# Patient Record
Sex: FEMALE | Race: WHITE | Employment: OTHER | URBAN - METROPOLITAN AREA
[De-identification: names, ages, dates, MRNs, and addresses within clinical notes are randomized per-mention and may not be internally consistent; named-entity substitution may affect disease eponyms.]

---

## 2019-10-28 ENCOUNTER — ANESTHESIA EVENT (OUTPATIENT)
Dept: SURGERY | Facility: CLINIC | Age: 54
End: 2019-10-28
Payer: COMMERCIAL

## 2019-10-28 ENCOUNTER — ANESTHESIA (OUTPATIENT)
Dept: SURGERY | Facility: CLINIC | Age: 54
End: 2019-10-28
Payer: COMMERCIAL

## 2019-10-28 ENCOUNTER — APPOINTMENT (OUTPATIENT)
Dept: GENERAL RADIOLOGY | Facility: CLINIC | Age: 54
End: 2019-10-28
Attending: ORTHOPAEDIC SURGERY
Payer: COMMERCIAL

## 2019-10-28 ENCOUNTER — HOSPITAL ENCOUNTER (OUTPATIENT)
Facility: CLINIC | Age: 54
Discharge: HOME | End: 2019-10-29
Attending: ORTHOPAEDIC SURGERY | Admitting: ORTHOPAEDIC SURGERY
Payer: COMMERCIAL

## 2019-10-28 PROBLEM — M51.26 LUMBAR DISC HERNIATION: Status: ACTIVE | Noted: 2019-10-28

## 2019-10-28 LAB — HCG UR QL: NEGATIVE

## 2019-10-28 PROCEDURE — 25000125 ZZHC RX 250: Performed by: ANESTHESIOLOGY

## 2019-10-28 PROCEDURE — 25000128 H RX IP 250 OP 636: Performed by: ANESTHESIOLOGY

## 2019-10-28 PROCEDURE — 25000125 ZZHC RX 250: Performed by: NURSE ANESTHETIST, CERTIFIED REGISTERED

## 2019-10-28 PROCEDURE — 25800030 ZZH RX IP 258 OP 636: Performed by: ANESTHESIOLOGY

## 2019-10-28 PROCEDURE — 37000009 ZZH ANESTHESIA TECHNICAL FEE, EACH ADDTL 15 MIN: Performed by: ORTHOPAEDIC SURGERY

## 2019-10-28 PROCEDURE — 25800030 ZZH RX IP 258 OP 636: Performed by: NURSE ANESTHETIST, CERTIFIED REGISTERED

## 2019-10-28 PROCEDURE — 25000128 H RX IP 250 OP 636: Performed by: PHYSICIAN ASSISTANT

## 2019-10-28 PROCEDURE — 36000063 ZZH SURGERY LEVEL 4 EA 15 ADDTL MIN: Performed by: ORTHOPAEDIC SURGERY

## 2019-10-28 PROCEDURE — 81025 URINE PREGNANCY TEST: CPT | Performed by: ANESTHESIOLOGY

## 2019-10-28 PROCEDURE — 25000132 ZZH RX MED GY IP 250 OP 250 PS 637: Performed by: PHYSICIAN ASSISTANT

## 2019-10-28 PROCEDURE — 40000170 ZZH STATISTIC PRE-PROCEDURE ASSESSMENT II: Performed by: ORTHOPAEDIC SURGERY

## 2019-10-28 PROCEDURE — 25000128 H RX IP 250 OP 636: Performed by: ORTHOPAEDIC SURGERY

## 2019-10-28 PROCEDURE — 25000128 H RX IP 250 OP 636: Performed by: NURSE ANESTHETIST, CERTIFIED REGISTERED

## 2019-10-28 PROCEDURE — 25000566 ZZH SEVOFLURANE, EA 15 MIN: Performed by: ORTHOPAEDIC SURGERY

## 2019-10-28 PROCEDURE — 37000008 ZZH ANESTHESIA TECHNICAL FEE, 1ST 30 MIN: Performed by: ORTHOPAEDIC SURGERY

## 2019-10-28 PROCEDURE — 27210794 ZZH OR GENERAL SUPPLY STERILE: Performed by: ORTHOPAEDIC SURGERY

## 2019-10-28 PROCEDURE — 25000125 ZZHC RX 250: Performed by: ORTHOPAEDIC SURGERY

## 2019-10-28 PROCEDURE — 36000065 ZZH SURGERY LEVEL 4 W FLUORO 1ST 30 MIN: Performed by: ORTHOPAEDIC SURGERY

## 2019-10-28 PROCEDURE — 71000012 ZZH RECOVERY PHASE 1 LEVEL 1 FIRST HR: Performed by: ORTHOPAEDIC SURGERY

## 2019-10-28 PROCEDURE — 40000277 XR SURGERY CARM FLUORO LESS THAN 5 MIN W STILLS

## 2019-10-28 RX ORDER — HYDROMORPHONE HYDROCHLORIDE 1 MG/ML
.3-.5 INJECTION, SOLUTION INTRAMUSCULAR; INTRAVENOUS; SUBCUTANEOUS EVERY 5 MIN PRN
Status: DISCONTINUED | OUTPATIENT
Start: 2019-10-28 | End: 2019-10-28 | Stop reason: HOSPADM

## 2019-10-28 RX ORDER — CEFAZOLIN SODIUM 2 G/100ML
2 INJECTION, SOLUTION INTRAVENOUS
Status: COMPLETED | OUTPATIENT
Start: 2019-10-28 | End: 2019-10-28

## 2019-10-28 RX ORDER — HYDROCODONE BITARTRATE AND ACETAMINOPHEN 5; 325 MG/1; MG/1
1-2 TABLET ORAL EVERY 6 HOURS PRN
COMMUNITY

## 2019-10-28 RX ORDER — ONDANSETRON 4 MG/1
4 TABLET, ORALLY DISINTEGRATING ORAL EVERY 30 MIN PRN
Status: DISCONTINUED | OUTPATIENT
Start: 2019-10-28 | End: 2019-10-28 | Stop reason: HOSPADM

## 2019-10-28 RX ORDER — NALOXONE HYDROCHLORIDE 0.4 MG/ML
.1-.4 INJECTION, SOLUTION INTRAMUSCULAR; INTRAVENOUS; SUBCUTANEOUS
Status: CANCELLED | OUTPATIENT
Start: 2019-10-28 | End: 2019-10-29

## 2019-10-28 RX ORDER — DEXAMETHASONE SODIUM PHOSPHATE 4 MG/ML
INJECTION, SOLUTION INTRA-ARTICULAR; INTRALESIONAL; INTRAMUSCULAR; INTRAVENOUS; SOFT TISSUE PRN
Status: DISCONTINUED | OUTPATIENT
Start: 2019-10-28 | End: 2019-10-28

## 2019-10-28 RX ORDER — CEFAZOLIN SODIUM 1 G/3ML
1 INJECTION, POWDER, FOR SOLUTION INTRAMUSCULAR; INTRAVENOUS SEE ADMIN INSTRUCTIONS
Status: DISCONTINUED | OUTPATIENT
Start: 2019-10-28 | End: 2019-10-28 | Stop reason: HOSPADM

## 2019-10-28 RX ORDER — DIPHENHYDRAMINE HYDROCHLORIDE 50 MG/ML
INJECTION INTRAMUSCULAR; INTRAVENOUS PRN
Status: DISCONTINUED | OUTPATIENT
Start: 2019-10-28 | End: 2019-10-28

## 2019-10-28 RX ORDER — CHOLECALCIFEROL (VITAMIN D3) 50 MCG
1 TABLET ORAL DAILY
COMMUNITY

## 2019-10-28 RX ORDER — NALOXONE HYDROCHLORIDE 0.4 MG/ML
.1-.4 INJECTION, SOLUTION INTRAMUSCULAR; INTRAVENOUS; SUBCUTANEOUS
Status: DISCONTINUED | OUTPATIENT
Start: 2019-10-28 | End: 2019-10-29 | Stop reason: HOSPADM

## 2019-10-28 RX ORDER — ACETAMINOPHEN 500 MG
1000 TABLET ORAL EVERY 6 HOURS PRN
COMMUNITY

## 2019-10-28 RX ORDER — FENTANYL CITRATE 50 UG/ML
25-50 INJECTION, SOLUTION INTRAMUSCULAR; INTRAVENOUS
Status: DISCONTINUED | OUTPATIENT
Start: 2019-10-28 | End: 2019-10-28 | Stop reason: HOSPADM

## 2019-10-28 RX ORDER — SODIUM CHLORIDE 9 MG/ML
INJECTION, SOLUTION INTRAVENOUS CONTINUOUS
Status: DISCONTINUED | OUTPATIENT
Start: 2019-10-28 | End: 2019-10-28 | Stop reason: CLARIF

## 2019-10-28 RX ORDER — PROPOFOL 10 MG/ML
INJECTION, EMULSION INTRAVENOUS PRN
Status: DISCONTINUED | OUTPATIENT
Start: 2019-10-28 | End: 2019-10-28

## 2019-10-28 RX ORDER — LEVOTHYROXINE SODIUM 100 UG/1
100 TABLET ORAL DAILY
Status: DISCONTINUED | OUTPATIENT
Start: 2019-10-29 | End: 2019-10-29 | Stop reason: HOSPADM

## 2019-10-28 RX ORDER — PROCHLORPERAZINE MALEATE 5 MG
10 TABLET ORAL EVERY 6 HOURS PRN
Status: DISCONTINUED | OUTPATIENT
Start: 2019-10-28 | End: 2019-10-29 | Stop reason: HOSPADM

## 2019-10-28 RX ORDER — OXYCODONE HYDROCHLORIDE 5 MG/1
5-10 TABLET ORAL
Status: DISCONTINUED | OUTPATIENT
Start: 2019-10-28 | End: 2019-10-29 | Stop reason: HOSPADM

## 2019-10-28 RX ORDER — HYDROCORTISONE 25 MG/G
OINTMENT TOPICAL 2 TIMES DAILY PRN
COMMUNITY

## 2019-10-28 RX ORDER — ONDANSETRON 2 MG/ML
4 INJECTION INTRAMUSCULAR; INTRAVENOUS EVERY 6 HOURS PRN
Status: DISCONTINUED | OUTPATIENT
Start: 2019-10-28 | End: 2019-10-29 | Stop reason: HOSPADM

## 2019-10-28 RX ORDER — ACETAMINOPHEN 325 MG/1
975 TABLET ORAL ONCE
Status: DISCONTINUED | OUTPATIENT
Start: 2019-10-28 | End: 2019-10-28 | Stop reason: HOSPADM

## 2019-10-28 RX ORDER — PREDNISONE 10 MG/1
TABLET ORAL DAILY
Status: ON HOLD | COMMUNITY
End: 2019-10-29

## 2019-10-28 RX ORDER — BETAMETHASONE SODIUM PHOSPHATE AND BETAMETHASONE ACETATE 3; 3 MG/ML; MG/ML
INJECTION, SUSPENSION INTRA-ARTICULAR; INTRALESIONAL; INTRAMUSCULAR; SOFT TISSUE PRN
Status: DISCONTINUED | OUTPATIENT
Start: 2019-10-28 | End: 2019-10-28 | Stop reason: HOSPADM

## 2019-10-28 RX ORDER — CYCLOBENZAPRINE HCL 10 MG
10 TABLET ORAL
COMMUNITY

## 2019-10-28 RX ORDER — SCOLOPAMINE TRANSDERMAL SYSTEM 1 MG/1
1 PATCH, EXTENDED RELEASE TRANSDERMAL ONCE
Status: COMPLETED | OUTPATIENT
Start: 2019-10-28 | End: 2019-10-28

## 2019-10-28 RX ORDER — SODIUM CHLORIDE, SODIUM LACTATE, POTASSIUM CHLORIDE, CALCIUM CHLORIDE 600; 310; 30; 20 MG/100ML; MG/100ML; MG/100ML; MG/100ML
INJECTION, SOLUTION INTRAVENOUS CONTINUOUS
Status: DISCONTINUED | OUTPATIENT
Start: 2019-10-28 | End: 2019-10-28 | Stop reason: HOSPADM

## 2019-10-28 RX ORDER — ACETAMINOPHEN 325 MG/1
650 TABLET ORAL EVERY 6 HOURS PRN
Status: DISCONTINUED | OUTPATIENT
Start: 2019-10-28 | End: 2019-10-29 | Stop reason: HOSPADM

## 2019-10-28 RX ORDER — FENTANYL CITRATE 50 UG/ML
INJECTION, SOLUTION INTRAMUSCULAR; INTRAVENOUS PRN
Status: DISCONTINUED | OUTPATIENT
Start: 2019-10-28 | End: 2019-10-28

## 2019-10-28 RX ORDER — ONDANSETRON 2 MG/ML
4 INJECTION INTRAMUSCULAR; INTRAVENOUS EVERY 30 MIN PRN
Status: DISCONTINUED | OUTPATIENT
Start: 2019-10-28 | End: 2019-10-28 | Stop reason: HOSPADM

## 2019-10-28 RX ORDER — LANOLIN ALCOHOL/MO/W.PET/CERES
1000 CREAM (GRAM) TOPICAL DAILY
COMMUNITY

## 2019-10-28 RX ORDER — FENTANYL CITRATE 50 UG/ML
50 INJECTION, SOLUTION INTRAMUSCULAR; INTRAVENOUS EVERY 5 MIN PRN
Status: DISCONTINUED | OUTPATIENT
Start: 2019-10-28 | End: 2019-10-28 | Stop reason: HOSPADM

## 2019-10-28 RX ORDER — ONDANSETRON 2 MG/ML
INJECTION INTRAMUSCULAR; INTRAVENOUS PRN
Status: DISCONTINUED | OUTPATIENT
Start: 2019-10-28 | End: 2019-10-28

## 2019-10-28 RX ORDER — NEOSTIGMINE METHYLSULFATE 1 MG/ML
VIAL (ML) INJECTION PRN
Status: DISCONTINUED | OUTPATIENT
Start: 2019-10-28 | End: 2019-10-28

## 2019-10-28 RX ORDER — LEVOTHYROXINE SODIUM 100 UG/1
100 TABLET ORAL DAILY
COMMUNITY

## 2019-10-28 RX ORDER — TACROLIMUS 1 MG/G
OINTMENT TOPICAL 2 TIMES DAILY PRN
COMMUNITY

## 2019-10-28 RX ORDER — HYDROMORPHONE HYDROCHLORIDE 1 MG/ML
.3-.5 INJECTION, SOLUTION INTRAMUSCULAR; INTRAVENOUS; SUBCUTANEOUS EVERY 30 MIN PRN
Status: DISCONTINUED | OUTPATIENT
Start: 2019-10-28 | End: 2019-10-29 | Stop reason: HOSPADM

## 2019-10-28 RX ORDER — LIDOCAINE HYDROCHLORIDE 10 MG/ML
INJECTION, SOLUTION EPIDURAL; INFILTRATION; INTRACAUDAL; PERINEURAL PRN
Status: DISCONTINUED | OUTPATIENT
Start: 2019-10-28 | End: 2019-10-28 | Stop reason: HOSPADM

## 2019-10-28 RX ORDER — ONDANSETRON 4 MG/1
4 TABLET, ORALLY DISINTEGRATING ORAL EVERY 6 HOURS PRN
Status: DISCONTINUED | OUTPATIENT
Start: 2019-10-28 | End: 2019-10-29 | Stop reason: HOSPADM

## 2019-10-28 RX ORDER — LIDOCAINE 40 MG/G
CREAM TOPICAL
Status: DISCONTINUED | OUTPATIENT
Start: 2019-10-28 | End: 2019-10-29 | Stop reason: HOSPADM

## 2019-10-28 RX ORDER — LIDOCAINE HYDROCHLORIDE 20 MG/ML
INJECTION, SOLUTION INFILTRATION; PERINEURAL PRN
Status: DISCONTINUED | OUTPATIENT
Start: 2019-10-28 | End: 2019-10-28

## 2019-10-28 RX ORDER — GLYCOPYRROLATE 0.2 MG/ML
INJECTION, SOLUTION INTRAMUSCULAR; INTRAVENOUS PRN
Status: DISCONTINUED | OUTPATIENT
Start: 2019-10-28 | End: 2019-10-28

## 2019-10-28 RX ORDER — PROPOFOL 10 MG/ML
INJECTION, EMULSION INTRAVENOUS CONTINUOUS PRN
Status: DISCONTINUED | OUTPATIENT
Start: 2019-10-28 | End: 2019-10-28

## 2019-10-28 RX ORDER — CEFAZOLIN SODIUM 2 G/100ML
2 INJECTION, SOLUTION INTRAVENOUS EVERY 8 HOURS
Status: DISCONTINUED | OUTPATIENT
Start: 2019-10-28 | End: 2019-10-28 | Stop reason: CLARIF

## 2019-10-28 RX ORDER — KETOROLAC TROMETHAMINE 30 MG/ML
30 INJECTION, SOLUTION INTRAMUSCULAR; INTRAVENOUS ONCE
Status: COMPLETED | OUTPATIENT
Start: 2019-10-28 | End: 2019-10-28

## 2019-10-28 RX ORDER — TIZANIDINE HYDROCHLORIDE 4 MG/1
4 CAPSULE, GELATIN COATED ORAL 3 TIMES DAILY
COMMUNITY

## 2019-10-28 RX ADMIN — ROCURONIUM BROMIDE 40 MG: 10 INJECTION INTRAVENOUS at 15:16

## 2019-10-28 RX ADMIN — SODIUM CHLORIDE, POTASSIUM CHLORIDE, SODIUM LACTATE AND CALCIUM CHLORIDE: 600; 310; 30; 20 INJECTION, SOLUTION INTRAVENOUS at 17:08

## 2019-10-28 RX ADMIN — PHENYLEPHRINE HYDROCHLORIDE 100 MCG: 10 INJECTION INTRAVENOUS at 15:25

## 2019-10-28 RX ADMIN — FENTANYL CITRATE 50 MCG: 0.05 INJECTION, SOLUTION INTRAMUSCULAR; INTRAVENOUS at 17:18

## 2019-10-28 RX ADMIN — PROPOFOL 200 MG: 10 INJECTION, EMULSION INTRAVENOUS at 15:16

## 2019-10-28 RX ADMIN — NEOSTIGMINE METHYLSULFATE 5 MG: 1 INJECTION, SOLUTION INTRAVENOUS at 16:30

## 2019-10-28 RX ADMIN — ROCURONIUM BROMIDE 10 MG: 10 INJECTION INTRAVENOUS at 15:37

## 2019-10-28 RX ADMIN — SCOPALAMINE 1 PATCH: 1 PATCH, EXTENDED RELEASE TRANSDERMAL at 15:04

## 2019-10-28 RX ADMIN — PHENYLEPHRINE HYDROCHLORIDE 50 MCG: 10 INJECTION INTRAVENOUS at 16:21

## 2019-10-28 RX ADMIN — GLYCOPYRROLATE 0.8 MG: 0.2 INJECTION, SOLUTION INTRAMUSCULAR; INTRAVENOUS at 16:30

## 2019-10-28 RX ADMIN — OXYCODONE HYDROCHLORIDE 5 MG: 5 TABLET ORAL at 20:56

## 2019-10-28 RX ADMIN — KETOROLAC TROMETHAMINE 30 MG: 30 INJECTION, SOLUTION INTRAMUSCULAR at 17:13

## 2019-10-28 RX ADMIN — DEXAMETHASONE SODIUM PHOSPHATE 4 MG: 4 INJECTION, SOLUTION INTRA-ARTICULAR; INTRALESIONAL; INTRAMUSCULAR; INTRAVENOUS; SOFT TISSUE at 15:37

## 2019-10-28 RX ADMIN — ONDANSETRON 4 MG: 2 INJECTION INTRAMUSCULAR; INTRAVENOUS at 16:30

## 2019-10-28 RX ADMIN — LIDOCAINE HYDROCHLORIDE 90 MG: 20 INJECTION, SOLUTION INFILTRATION; PERINEURAL at 15:16

## 2019-10-28 RX ADMIN — FENTANYL CITRATE 100 MCG: 50 INJECTION, SOLUTION INTRAMUSCULAR; INTRAVENOUS at 15:16

## 2019-10-28 RX ADMIN — HYDROMORPHONE HYDROCHLORIDE 0.5 MG: 10 INJECTION, SOLUTION INTRAMUSCULAR; INTRAVENOUS; SUBCUTANEOUS at 16:11

## 2019-10-28 RX ADMIN — ACETAMINOPHEN 650 MG: 325 TABLET, FILM COATED ORAL at 20:57

## 2019-10-28 RX ADMIN — PROPOFOL 30 MCG/KG/MIN: 10 INJECTION, EMULSION INTRAVENOUS at 15:33

## 2019-10-28 RX ADMIN — MIDAZOLAM 2 MG: 1 INJECTION INTRAMUSCULAR; INTRAVENOUS at 15:13

## 2019-10-28 RX ADMIN — DIPHENHYDRAMINE HYDROCHLORIDE 12.5 MG: 50 INJECTION, SOLUTION INTRAMUSCULAR; INTRAVENOUS at 15:17

## 2019-10-28 RX ADMIN — CEFAZOLIN SODIUM 2 G: 2 INJECTION, SOLUTION INTRAVENOUS at 15:25

## 2019-10-28 RX ADMIN — DEXMEDETOMIDINE HYDROCHLORIDE 12 MCG: 100 INJECTION, SOLUTION INTRAVENOUS at 16:32

## 2019-10-28 RX ADMIN — SODIUM CHLORIDE, POTASSIUM CHLORIDE, SODIUM LACTATE AND CALCIUM CHLORIDE: 600; 310; 30; 20 INJECTION, SOLUTION INTRAVENOUS at 15:13

## 2019-10-28 SDOH — HEALTH STABILITY: MENTAL HEALTH: HOW OFTEN DO YOU HAVE A DRINK CONTAINING ALCOHOL?: NEVER

## 2019-10-28 ASSESSMENT — LIFESTYLE VARIABLES: TOBACCO_USE: 0

## 2019-10-28 ASSESSMENT — MIFFLIN-ST. JEOR: SCORE: 1592.43

## 2019-10-28 NOTE — BRIEF OP NOTE
[facility]  Spinal Surgery Brief Operative Note    Pre-operative diagnosis: RECURRENT LEFT L5-S1 DISC HERNIATION WITH LEFT LEG RADICULOPATHY   Post-operative diagnosis: Same   Procedure: REVISION LEFT L5-S1 DISCECTOMY   Surgeon: CATHY REED MD   Assistant(s): DANNY TRUONG PA-C   Anesthesia: General endotracheal anesthesia   Estimated blood loss: 10 ml   Total IV fluids: (See anesthesia record)   Blood transfusion: NONE   Drains: None   Specimens: NONE   Implants: AS ABOVE   Findings: AS ABOVE   Complications: NONE   Condition: STABLE   Comments: SEE DICTATED OPERATIVE REPORT FOR DETAILS

## 2019-10-28 NOTE — OR NURSING
Report called to STEVEN Mcclain. Asked us to wait 20 minutes because other patient in room being admitted

## 2019-10-28 NOTE — BRIEF OP NOTE
[facility]  Spinal Surgery Brief Operative Note    Pre-operative diagnosis: RIGHT L4-5 DISC HERNIATION AND RIGHT LEG RADICULOPATHY WITH MOTOR AND SENSORY LOSS   Post-operative diagnosis: Same   Procedure: RIGHT L4-5 DISCECTOMY   Surgeon: CATHY REED MD   Assistant(s): DANNY TRUONG PA-C   Anesthesia: General endotracheal anesthesia   Estimated blood loss: 10 ml   Total IV fluids: (See anesthesia record)   Blood transfusion: NONE   Drains: None   Specimens: NONE   Implants: AS ABOVE   Findings: AS ABOVE   Complications: NONE   Condition: STABLE   Comments: SEE DICTATED OPERATIVE REPORT FOR DETAILS

## 2019-10-28 NOTE — ANESTHESIA CARE TRANSFER NOTE
Patient: Candie Gan    Procedure(s):  RIGHT L4-L5 LUMBAR DISCECTOMY    Diagnosis: Neuritis or radiculitis due to rupture of lumbar intervertebral disc [M51.16]  Diagnosis Additional Information: No value filed.    Anesthesia Type:   General, ETT     Note:  Airway :Face Mask  Patient transferred to:PACU  Comments: Pt awake and able to verbalize needs. ALL monitors on and audible. Vital signs stable. Spontaneous respiration without difficulty. o2 sats 100% on 10Lo2 per face mask. Pt denies pain. Report given to PACU RN.Handoff Report: Identifed the Patient, Identified the Reponsible Provider, Reviewed the pertinent medical history, Discussed the surgical course, Reviewed Intra-OP anesthesia mangement and issues during anesthesia, Set expectations for post-procedure period and Allowed opportunity for questions and acknowledgement of understanding      Vitals: (Last set prior to Anesthesia Care Transfer)    CRNA VITALS  10/28/2019 1618 - 10/28/2019 1700      10/28/2019             Pulse:  87    SpO2:  98 %    Resp Rate (observed):  (!) 2                Electronically Signed By: AYNY Peres CRNA  October 28, 2019  5:00 PM

## 2019-10-28 NOTE — ANESTHESIA PREPROCEDURE EVALUATION
"Anesthesia Pre-Procedure Evaluation    Patient: Candie Gan   MRN: [id number] : 1965          Preoperative Diagnosis: Neuritis or radiculitis due to rupture of lumbar intervertebral disc [M51.16]    Procedure(s):  RIGHT L4-L5 DISCECTOMY  (TORRES FRAME, C ARM)    Past Medical History:   Diagnosis Date     Hyperlipidemia      Low back pain     Sciatica- right side      Migraine     post anesthesia     PONV (postoperative nausea and vomiting)      Thyroid disease     hypothyroid      Past Surgical History:   Procedure Laterality Date     COLONOSCOPY       ENT SURGERY      tonsillectomy/wisdom teeth extraction        Anesthesia Evaluation     . Pt has had prior anesthetic. Type: General    History of anesthetic complications   - PONV        ROS/MED HX    ENT/Pulmonary:      (-) tobacco use and sleep apnea   Neurologic:     (+)neuropathy migraines,     Cardiovascular:     (+) Dyslipidemia, ----. : . . . :. .       METS/Exercise Tolerance:     Hematologic:         Musculoskeletal:         GI/Hepatic:        (-) GERD   Renal/Genitourinary:         Endo:     (+) thyroid problem hypothyroidism, .      Psychiatric:         Infectious Disease:         Malignancy:         Other:                          Physical Exam  Normal systems: cardiovascular, pulmonary and dental    Airway   Mallampati: III  TM distance: >3 FB  Neck ROM: full    Dental     Cardiovascular       Pulmonary             Lab Results   Component Value Date    HCG Negative 10/28/2019       Preop Vitals  BP Readings from Last 3 Encounters:   10/28/19 132/78    Pulse Readings from Last 3 Encounters:   No data found for Pulse      Resp Readings from Last 3 Encounters:   10/28/19 16    SpO2 Readings from Last 3 Encounters:   10/28/19 97%      Temp Readings from Last 1 Encounters:   10/28/19 36.7  C (98.1  F) (Temporal)    Ht Readings from Last 1 Encounters:   10/28/19 1.702 m (5' 7\")      Wt Readings from Last 1 Encounters:   10/28/19 96.5 kg (212 lb 11.2 " "oz)    Estimated body mass index is 33.31 kg/m  as calculated from the following:    Height as of this encounter: 1.702 m (5' 7\").    Weight as of this encounter: 96.5 kg (212 lb 11.2 oz).       Anesthesia Plan      History & Physical Review  History and physical reviewed and following examination; no interval change.    ASA Status:  2 .    NPO Status:  > 8 hours    Plan for General and ETT with Intravenous and Propofol induction. Maintenance will be TIVA.    PONV prophylaxis:  Ondansetron (or other 5HT-3) and Dexamethasone or Solumedrol  12.5mg Benadryl, 4mg Decadron, Background propofol and Zofran      Postoperative Care  Postoperative pain management:  IV analgesics and Oral pain medications.      Consents  Anesthetic plan, risks, benefits and alternatives discussed with:  Patient..                 Jey Pimentel MD  "

## 2019-10-29 VITALS
WEIGHT: 212.7 LBS | HEART RATE: 57 BPM | OXYGEN SATURATION: 92 % | TEMPERATURE: 98.4 F | HEIGHT: 67 IN | BODY MASS INDEX: 33.38 KG/M2 | SYSTOLIC BLOOD PRESSURE: 127 MMHG | RESPIRATION RATE: 16 BRPM | DIASTOLIC BLOOD PRESSURE: 77 MMHG

## 2019-10-29 PROCEDURE — 25000132 ZZH RX MED GY IP 250 OP 250 PS 637: Performed by: ORTHOPAEDIC SURGERY

## 2019-10-29 PROCEDURE — 25000132 ZZH RX MED GY IP 250 OP 250 PS 637: Performed by: PHYSICIAN ASSISTANT

## 2019-10-29 RX ADMIN — ACETAMINOPHEN 650 MG: 325 TABLET, FILM COATED ORAL at 10:26

## 2019-10-29 RX ADMIN — OXYCODONE HYDROCHLORIDE 5 MG: 5 TABLET ORAL at 10:23

## 2019-10-29 RX ADMIN — ACETAMINOPHEN 650 MG: 325 TABLET, FILM COATED ORAL at 05:48

## 2019-10-29 RX ADMIN — OXYCODONE HYDROCHLORIDE 5 MG: 5 TABLET ORAL at 05:48

## 2019-10-29 RX ADMIN — Medication 1 LOZENGE: at 01:00

## 2019-10-29 NOTE — DISCHARGE SUMMARY
[facility]    Discharge Summary  Spine Surgery    Date of Admission:  10/28/2019  Date of Discharge:  10/29/2019  Discharging Provider: Sotero Grider  Date of Service (when I saw the patient): 10/29/19    Primary Diagnosis This Admission:  Extruded right L4-L5 disc herniation with right leg radiculopathy including motor and sensory deficit.      Additional Diagnosis This Admission:    Principal Procedure This Admission:  Right L4-L5 discectomy.   Additional Procedures:    Hospital Course:  The patient underwent the above-noted procedure on the day of admission.  She had immediate relief of her preoperative right leg pain.  She still has motor and sensory weakness this morning.  She feels the strength has improved.  Numbness is in an L5 distribution and is still prevalent.    Her examination this morning showed EHL strength on the right 2-3 out of 5, anterior tib is 4/5.  Sensation is improved history of decreased    The plan will be to change her dressing this morning.  Regular diet.  Ambulate in halls.  Discharged home later this morning.  She has pain medication at home.  No new prescription.  She has a follow-up appointment to see me in 2 to 3 weeks.  She will get a discharge instruction sheet and dressing supplies.    Pain Medication on Discharge: Patient has pain medication at home, hydrocodone on discharge.  She can also take plain Tylenol and ibuprofen.    Antibiotics prescribed at discharge: None prescribed     Sotero Grider MD    Discharge Disposition   Discharged to home   Condition at discharge: Stable    Primary Care Physician   Physician No Ref-Primary    Consultations This Hospital Stay   None    Time Spent on this Encounter   I have spent less than 30 minutes on this discharge.    Discharge Orders   No discharge procedures on file.  Discharge Medications   Current Discharge Medication List      CONTINUE these medications which have NOT CHANGED    Details   acetaminophen  (TYLENOL) 500 MG tablet Take 1,000 mg by mouth every 6 hours as needed for mild pain      cyanocobalamin (VITAMIN B-12) 1000 MCG tablet Take 1,000 mcg by mouth daily      hydrocortisone 2.5 % ointment Apply topically 2 times daily as needed      levothyroxine (SYNTHROID/LEVOTHROID) 100 MCG tablet Take 100 mcg by mouth daily      predniSONE (DELTASONE) 10 MG tablet Take by mouth daily Take   20mg (2x10mg) by mouth twice daily X 4 days, then  30mg (3x10mg) by mouth once daily X 4 days, then  20mg (2x10mg) by mouth once daily X 4 days, then  10mg (1x10mg) by mouth once daily X 4 days      tacrolimus (PROTOPIC) 0.1 % external ointment Apply topically 2 times daily as needed      tiZANidine (ZANAFLEX) 4 MG capsule Take 4 mg by mouth 3 times daily      tiZANidine (ZANAFLEX) 4 MG tablet Take 4 mg by mouth 3 times daily as needed for muscle spasms      vitamin D3 (CHOLECALCIFEROL) 2000 units (50 mcg) tablet Take 1 tablet by mouth daily      cyclobenzaprine (FLEXERIL) 10 MG tablet Take 10 mg by mouth nightly as needed for muscle spasms      HYDROcodone-acetaminophen (NORCO) 5-325 MG tablet Take 1-2 tablets by mouth every 6 hours as needed for severe pain           Allergies   No Known Allergies  Data   Most Recent 3 CBC's:No lab results found.   Most Recent 3 BMP's:No lab results found.  Most Recent 2 LFT's:No lab results found.  Most Recent INR's and Anticoagulation Dosing History:  Anticoagulation Dose History     There is no flowsheet data to display.        Most Recent 3 Troponin's:No lab results found.  Most Recent Cholesterol Panel:No lab results found.  Most Recent 6 Bacteria Isolates From Any Culture (See EPIC Reports for Culture Details):No lab results found.  Most Recent TSH, T4 and A1c Labs:No lab results found.

## 2019-10-29 NOTE — PLAN OF CARE
Pt POD 1 with Dr. Grider. A/O. CMS intact except RLE weakness and numbness in foot. VSS. Pain controlled with tylenol and oxycodone as needed. Vdg, low PVR. +flatus. Tolerating regular diet. Up with SBA. Discharged to home. Education on medications and follow up care provided. Sent with belongings.

## 2019-10-29 NOTE — ANESTHESIA POSTPROCEDURE EVALUATION
Patient: Candie Gan    Procedure(s):  RIGHT L4-L5 LUMBAR DISCECTOMY    Diagnosis:Neuritis or radiculitis due to rupture of lumbar intervertebral disc [M51.16]  Diagnosis Additional Information: No value filed.    Anesthesia Type:  General, ETT    Note:  Anesthesia Post Evaluation    Patient location during evaluation: PACU  Patient participation: Able to fully participate in evaluation  Level of consciousness: awake  Pain management: adequate  Airway patency: patent  Cardiovascular status: acceptable  Respiratory status: acceptable  Hydration status: acceptable  PONV: none     Anesthetic complications: None          Last vitals:  Vitals:    10/28/19 2220 10/29/19 0028 10/29/19 0526   BP: (!) 156/89 127/80 (!) 146/90   Pulse:      Resp: 16 18 18   Temp: 36.3  C (97.4  F) 36.6  C (97.8  F) 36.3  C (97.3  F)   SpO2: 95% 94% 96%         Electronically Signed By: Marge Self MD, MD  October 29, 2019  5:41 AM

## 2019-10-29 NOTE — PLAN OF CARE
A&O, VS ex BP slightly elevated on RA, LS clear, BS audible and normoactive, passing gas, advanced to regular diet, tolerated full liquids well, denies nausea, voiding adequately, with baseline numbness on RLE, dressing on incision CDI, up with SBA contact guard, pain managed with 5mg Oxycodone and tylenol. Patient is requesting to be discharged by 10am to 11am tomorrow.

## 2019-10-29 NOTE — DISCHARGE INSTRUCTIONS
Care after a Discectomy/Decompression - Dr. Sotero Grider    The following information will help you through your recovery at home.  Pain    It is normal for you to experience some pain in the area of your incision after your surgery.  Some of your leg pain may go away immediately after your surgery.  Some of the pain will gradually decrease over the next few days or weeks depending on the amount of inflammation present in the nerve.      Sometimes Dr. Grider will place a steroid preparation on the nerve during surgery.   This may help decrease the nerve inflammation for the first few days after surgery.  If some of your leg pain returns 3 to 5 days after surgery it may be due to the steroid wearing off.  The pain should not be as bad as your pre-op pain and will diminish over a period of weeks.      You should call Dr. Grider s office if your leg pain returns suddenly and does not improve over 24 hours.    Activity    You may increase your activity as tolerated; walking is the best form of exercise after spine surgery.      Avoid bending, lifting, and twisting (BLT).  Avoid activities such as vacuuming, raking and shoveling.    Try to limit your lifting to 10-15lbs during the first 2 weeks and then increase to 20-25lbs over the next 6 weeks.    You may return to work approximately 1- 2 weeks after your surgery, if you have a sedentary or desk type job.  If you have a physical job Dr. Grider and his staff will help you determine a return to work plan.      You may resume sexual activity when you feel ready.  Stop if you have pain.    Driving    You may drive if you feel strong enough and are not taking any narcotic pain medications.    Incision Site     The first 3 days after surgery Dr. Grider would like you to keep your incision dry.   You may take a sponge bath during this time or cover your dressing with a transparent material called Tegaderm (sold at most pharmacies).      The first 3 days after surgery you  should change your gauze dressing at least once a day.  After 3 days you may remove the gauze dressing and leave it off, at this point you may shower without covering your incision, allow water and soap to run over your incision but do not scrub the area or soak in a tub.    Your incision is covered with steri-strips (narrow white tapes); they will get loose and fall of in 7-10 days.  If they do not fall off after 10 days you may remove them or Dr. Cheo martínez staff will remove them at your post-op visit.    Most patients have dissolvable stitches which do not need to be removed.  In some cases nylon stitches are used and they need to be removed, 10-14 days after surgery.  If you have staples or nylon sutures please call for a removal appointment with Dr. Cheo martínez nurse.    Pain Management     Take your prescribed pain medication as needed and directed.  Use Tylenol and Ibuprofen for your discomfort when you no longer need the narcotic pain medication.      If you need a refill on your pain medication call [phone], please allow 24 hours for your prescription to be refilled, Dr. Cheo martínez office does not refill pain medications on Friday.   Diet     Eat a healthy diet; this will help your recovery.    Drink plenty of fluids, water, milk or juice.    If you have trouble with constipation you should eat more fiber, drink more fluids, increase your walking or try an over the counter laxative.    Follow-up Visits    You should have a post-op appointment that was scheduled for you at the same time your surgery was scheduled. If you do not, please call Dr. Cheo martínez office when you get home from your surgery.    Write down any questions you have about your surgery, recovery, return to work and other topics you wished to be covered at your post-op visit.  This way, we will be able to address all of your questions at your next visit.    Call Dr. Cheo martínez office if you have any questions or concerns.    When to Call your  Doctor:    If you have any redness, warmth or swelling at the incision site.    If your incision opens up.    If you have increasing drainage from your incision.    If you have a temperature greater than 100.5 degrees Fahrenheit.    [address] [place] [id number]  Phone: [phone]  Fax: [phone]  Web site: [email or web address]

## 2019-10-29 NOTE — PLAN OF CARE
Pt A&Ox4. VSS. Slight numbness to lateral and posterior portion of RLE. Dorsi/plantar in R foot improving per pt. Otherwise CMS intact. BS 4+, passing flatus. Dressing to lower back C/D/I. Reported mild pain to lower back, decreased with cold packs, PRN Tylenol and 5mg PRN oxycodone. Plan to discharge later today. Pt requesting to be discharged between 10-11am. Continue with current POC

## 2019-10-29 NOTE — OP NOTE
Procedure Date: 10/28/2019      PREOPERATIVE DIAGNOSIS:  Extruded right L4-L5 disc herniation with right leg radiculopathy including motor and sensory deficit.      POSTOPERATIVE DIAGNOSIS:  Extruded right L4-L5 disc herniation with right leg radiculopathy including motor and sensory deficit.      PROCEDURE:  Right L4-L5 discectomy.      SURGEON:  Sotero Grider MD      ASSISTANT:  Suzi Moore PA-C      ANESTHESIA:  General.      OPERATIVE DESCRIPTION:  The patient was brought to the operating room.  A general anesthetic was administered.  The patient was positioned prone on the Agustin frame, carefully padded and positioned, and her back was prepped and draped in a routine sterile fashion.  A marking needle was placed over what was felt to be the L4-L5 interspace.  A lateral x-ray was obtained.  This confirmed that it was at L4-L5.  The needle was removed.  The planned skin incision was injected with 10 mL of 0.5% Marcaine with epinephrine.  After the timeout, a midline skin incision was made between L4 and L5.  A right-sided exposure was performed.  A clamp was placed on the spinous process of what was felt to be L5 and a lateral x-ray was obtained.  This again confirmed our level.  The right L4-L5 interspace was exposed.  A Latha retractor was placed.  She had a narrow interlaminar space.  I had to do a slight laminotomy get at the right L4-L5 ligamentum flavum.  I made the laminotomy across the cranial edge of the lamina of L5 and I carried it down to just below the level of the pedicle to the axilla of the nerve root.  I had good visualization of the traversing L5 nerve root.  I could feel the disk material underneath the nerve root; it was right at the level of the pedicle.  I mobilized the nerve root medially.  My assistant held the nerve retracted with the [employer] retractor.  We pulled the extruded fragment of disc, which was basically nucleus pulposus, out from right at the edge of the disc and right  from underneath the L5 nerve root.  I was then able to mobilize the nerve easier and could put it a little further medial.  We found another free fragment of disc that was also removed.  This completely freed up the nerve.  There were some frayed edges coming out of the hole in the annulus, which we cleaned out.  At this point, we had a good decompression of the nerve.  The wound was irrigated with antibiotic solution.  Additional Marcaine was used in skin, fascia and muscle.  Celestone and Gelfoam were placed over the nerve and dura.      The wound was closed in routine fashion with #1 Stratafix suture for the fascia, 2-0 Stratafix suture for the subcutaneous, and 3-0 Stratafix for the skin.  Dressings were applied, drapes removed.  She was transferred back to the hospital cart, and taken to the recovery room in good condition.      ESTIMATED BLOOD LOSS:  10 mL.      COMPLICATIONS:  None.      DRAINS:  None.         SOTERO REED MD             D: 10/28/2019   T: 10/28/2019   MT: [id number]      Name:     MARY AL   MRN:      [id number]        Account:        [id number]   :      1965           Procedure Date: 10/28/2019      Document: [id number]       cc: Sotero Quintero MD

## 2020-03-11 ENCOUNTER — HEALTH MAINTENANCE LETTER (OUTPATIENT)
Age: 55
End: 2020-03-11

## 2021-01-03 ENCOUNTER — HEALTH MAINTENANCE LETTER (OUTPATIENT)
Age: 56
End: 2021-01-03

## 2021-04-25 ENCOUNTER — HEALTH MAINTENANCE LETTER (OUTPATIENT)
Age: 56
End: 2021-04-25

## 2021-10-10 ENCOUNTER — HEALTH MAINTENANCE LETTER (OUTPATIENT)
Age: 56
End: 2021-10-10

## 2021-12-05 ENCOUNTER — HEALTH MAINTENANCE LETTER (OUTPATIENT)
Age: 56
End: 2021-12-05

## 2022-05-22 ENCOUNTER — HEALTH MAINTENANCE LETTER (OUTPATIENT)
Age: 57
End: 2022-05-22

## 2022-09-18 ENCOUNTER — HEALTH MAINTENANCE LETTER (OUTPATIENT)
Age: 57
End: 2022-09-18

## 2023-06-04 ENCOUNTER — HEALTH MAINTENANCE LETTER (OUTPATIENT)
Age: 58
End: 2023-06-04

## 2023-12-17 ENCOUNTER — HEALTH MAINTENANCE LETTER (OUTPATIENT)
Age: 58
End: 2023-12-17

## 2025-04-22 ENCOUNTER — TRANSFERRED RECORDS (OUTPATIENT)
Dept: HEALTH INFORMATION MANAGEMENT | Facility: CLINIC | Age: 60
End: 2025-04-22
Payer: COMMERCIAL

## 2025-05-01 ENCOUNTER — TRANSFERRED RECORDS (OUTPATIENT)
Dept: HEALTH INFORMATION MANAGEMENT | Facility: CLINIC | Age: 60
End: 2025-05-01
Payer: COMMERCIAL

## 2025-08-18 ENCOUNTER — TRANSFERRED RECORDS (OUTPATIENT)
Dept: HEALTH INFORMATION MANAGEMENT | Facility: CLINIC | Age: 60
End: 2025-08-18
Payer: COMMERCIAL

## (undated) DEVICE — DRSG ADAPTIC 3X3" 6112

## (undated) DEVICE — PACK SPINE SM CUSTOM SNE15SSFSK

## (undated) DEVICE — ESU GROUND PAD UNIVERSAL W/O CORD

## (undated) DEVICE — GLOVE PROTEXIS BLUE W/NEU-THERA 7.5  2D73EB75

## (undated) DEVICE — SYR 10ML FINGER CONTROL W/O NDL 309695

## (undated) DEVICE — DRSG STERI STRIP 1/2X4" R1547

## (undated) DEVICE — SOL WATER IRRIG 1000ML BOTTLE 2F7114

## (undated) DEVICE — MANIFOLD NEPTUNE 4 PORT 700-20

## (undated) DEVICE — NDL 19GA 1.5"

## (undated) DEVICE — SPONGE SURGIFOAM 50

## (undated) DEVICE — TUBING SUCTION SOFT 20'X3/16" 0036570

## (undated) DEVICE — SU STRATAFIX PDS PLUS CT-1 30CM SXPP1A435

## (undated) DEVICE — GLOVE PROTEXIS POWDER FREE 6.5 ORTHOPEDIC 2D73ET65

## (undated) DEVICE — SYR 01ML 27GA 0.5" NDL TBC 309623

## (undated) DEVICE — DRAPE SHEET REV FOLD 3/4 9349

## (undated) DEVICE — LINEN TOWEL PACK X5 5464

## (undated) DEVICE — SU STRATAFIX MONOCRYL 3-0 SPIRAL PS-2 15CM SXMP1B105

## (undated) DEVICE — SU STRATAFIX PDS PLUS 2-0 SPIRAL CT-1 9" 22CM SXPP1B456

## (undated) DEVICE — POSITIONER PT PRONESAFE HEAD REST W/DERMAPROX INSERT 40599

## (undated) DEVICE — NDL 30GA 1" 305128

## (undated) DEVICE — PREP CHLORAPREP 26ML TINTED ORANGE  260815

## (undated) DEVICE — ESU ELEC BLADE 4" COATED

## (undated) DEVICE — GLOVE PROTEXIS POWDER FREE 8.0 ORTHOPEDIC 2D73ET80

## (undated) DEVICE — GLOVE PROTEXIS BLUE W/NEU-THERA 6.5  2D73EB65

## (undated) RX ORDER — BUPIVACAINE HYDROCHLORIDE 5 MG/ML
INJECTION, SOLUTION EPIDURAL; INTRACAUDAL
Status: DISPENSED
Start: 2019-10-28

## (undated) RX ORDER — KETOROLAC TROMETHAMINE 30 MG/ML
INJECTION, SOLUTION INTRAMUSCULAR; INTRAVENOUS
Status: DISPENSED
Start: 2019-10-28

## (undated) RX ORDER — LIDOCAINE HYDROCHLORIDE 10 MG/ML
INJECTION, SOLUTION EPIDURAL; INFILTRATION; INTRACAUDAL; PERINEURAL
Status: DISPENSED
Start: 2019-10-28

## (undated) RX ORDER — PROPOFOL 10 MG/ML
INJECTION, EMULSION INTRAVENOUS
Status: DISPENSED
Start: 2019-10-28

## (undated) RX ORDER — BETAMETHASONE SODIUM PHOSPHATE AND BETAMETHASONE ACETATE 3; 3 MG/ML; MG/ML
INJECTION, SUSPENSION INTRA-ARTICULAR; INTRALESIONAL; INTRAMUSCULAR; SOFT TISSUE
Status: DISPENSED
Start: 2019-10-28

## (undated) RX ORDER — HYDROMORPHONE HYDROCHLORIDE 1 MG/ML
INJECTION, SOLUTION INTRAMUSCULAR; INTRAVENOUS; SUBCUTANEOUS
Status: DISPENSED
Start: 2019-10-28

## (undated) RX ORDER — LIDOCAINE HYDROCHLORIDE 20 MG/ML
INJECTION, SOLUTION EPIDURAL; INFILTRATION; INTRACAUDAL; PERINEURAL
Status: DISPENSED
Start: 2019-10-28

## (undated) RX ORDER — DIPHENHYDRAMINE HYDROCHLORIDE 50 MG/ML
INJECTION INTRAMUSCULAR; INTRAVENOUS
Status: DISPENSED
Start: 2019-10-28

## (undated) RX ORDER — FENTANYL CITRATE 0.05 MG/ML
INJECTION, SOLUTION INTRAMUSCULAR; INTRAVENOUS
Status: DISPENSED
Start: 2019-10-28

## (undated) RX ORDER — CEFAZOLIN SODIUM 2 G/100ML
INJECTION, SOLUTION INTRAVENOUS
Status: DISPENSED
Start: 2019-10-28

## (undated) RX ORDER — EPINEPHRINE 1 MG/ML
INJECTION, SOLUTION INTRAMUSCULAR; SUBCUTANEOUS
Status: DISPENSED
Start: 2019-10-28

## (undated) RX ORDER — SCOLOPAMINE TRANSDERMAL SYSTEM 1 MG/1
PATCH, EXTENDED RELEASE TRANSDERMAL
Status: DISPENSED
Start: 2019-10-28

## (undated) RX ORDER — FENTANYL CITRATE 50 UG/ML
INJECTION, SOLUTION INTRAMUSCULAR; INTRAVENOUS
Status: DISPENSED
Start: 2019-10-28